# Patient Record
Sex: MALE
[De-identification: names, ages, dates, MRNs, and addresses within clinical notes are randomized per-mention and may not be internally consistent; named-entity substitution may affect disease eponyms.]

---

## 2018-05-09 ENCOUNTER — HOSPITAL ENCOUNTER (EMERGENCY)
Dept: HOSPITAL 25 - UCEAST | Age: 33
Discharge: HOME | End: 2018-05-09
Payer: COMMERCIAL

## 2018-05-09 VITALS — DIASTOLIC BLOOD PRESSURE: 72 MMHG | SYSTOLIC BLOOD PRESSURE: 130 MMHG
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
## 2018-05-09 DIAGNOSIS — T15.02XA: Primary | ICD-10-CM
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
## 2018-05-09 PROCEDURE — G0463 HOSPITAL OUTPT CLINIC VISIT: HCPCS

## 2018-05-09 PROCEDURE — 99203 OFFICE O/P NEW LOW 30 MIN: CPT

## 2018-05-09 NOTE — UC
Eye Complaint HPI





- HPI Summary


HPI Summary: 


PATIENT PRESENTS WITH 2 DAYS OF LEFT EYE REDNESS, IRRITATION AND FOREIGN BODY 

SENSATION.  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 

IN HIS EYE.  [place] AND WILL BE HEADING 

THROUGH [place] AND TOWARD THE [facility] TOMORROW.  HE HAS DECREASED 

VISION IN HIS LEFT EYE AND SOME PHOTOPHOBIA.  LEFT EYE IS TEARING.





- History of Current Complaint


Chief Complaint: UCEye


Stated Complaint: LEFT EYE REDNESS


Time Seen by Provider: 05/09/18 20:14


Hx Obtained From: Patient


Onset/Duration: Gradual Onset, Lasting Days, Still Present


Timing: Constant


Severity Initially: Moderate


Severity Currently: Moderate


Pain Intensity: 3


Pain Scale Used: 0-10 Numeric


Aggravating Factor(s): Nothing


Alleviating Factor(s): Nothing


Associated Signs And Symptoms: Positive: Photophobia, Drainage (Clear), Vision 

Impairment Left





- Allergies/Home Medications


Allergies/Adverse Reactions: 


 Allergies











Allergy/AdvReac Type Severity Reaction Status Date / Time


 


No Known Allergies Allergy   Verified 05/09/18 20:12














PMH/Surg Hx/FS Hx/Imm Hx


Previously Healthy: Yes





- Surgical History


Surgical History: Yes


Surgery Procedure, Year, and Place: DILATED VEINS IN SCROTUM





- Family History


Known Family History: 


   Negative: Hypertension





- Social History


Alcohol Use: None


Substance Use Type: None


Smoking Status (MU): Never Smoked Tobacco





Review of Systems


Constitutional: Negative


Skin: Negative


Eyes: Blurred Vision, Drainage, Eye Redness, Photophobia


Respiratory: Negative


Cardiovascular: Negative


Gastrointestinal: Negative


All Other Systems Reviewed And Are Negative: Yes





Physical Exam


Triage Information Reviewed: Yes


Appearance: Well-Appearing, Well-Nourished, Pain Distress - MOD


Vital Signs: 


 Initial Vital Signs











Temp  98 F   05/09/18 20:03


 


Pulse  72   05/09/18 20:03


 


Resp  16   05/09/18 20:03


 


BP  130/72   05/09/18 20:03


 


Pulse Ox  97   05/09/18 20:03











Vital Signs Reviewed: Yes


Eyes: Positive: Conjunctiva Inflamed - LEFT, Discharge - Clear drainage from 

left eye, Other: - PERRLA, EOMI.  FOREIGN BODY LOCATED CENTRALLY LEFT CORNEA


ENT: Positive: Hearing grossly normal


Neck: Positive: Supple


Respiratory: Positive: No respiratory distress, No accessory muscle use


Cardiovascular: Positive: Pulses Normal


Abdomen Description: Positive: Soft


Musculoskeletal: Positive: No Edema


Neurological: Positive: Alert


Psychological: Positive: Age Appropriate Behavior


Skin: Negative: rashes





Eye Complaint Course/Dx





- Course


Course Of Treatment: ATTEMPTED TO REMOVE FB USING 18G NEEDLE AND COTTON SWAB. 

MAJORITY REMOVED BUT A SMALL PIECE IS RETAINED. RECOMMEND OPHTH F/U TOMORROW. 

POLYTRIM EYE DROPS TO PREVENT INFECTION AND KETOROLAC EYE DROPS AS NEEDED FOR 

DISCOMFORT.





- Differential Dx/Diagnosis


Provider Diagnoses: LEFT EYE FB - RETAINED





Discharge





- Sign-Out/Discharge


Documenting (check all that apply): Discharge/Admit/Transfer





- Discharge Plan


Condition: Stable


Disposition: HOME


Prescriptions: 


Ketorolac 0.5% OPHTH (NF) 1 drop LEFT EYE QID PRN #1 btl


 PRN Reason: Pain


Patient Education Materials:  Corneal Abrasion (ED), Eye Foreign Body (ED)


Referrals: 


No Primary Care Phys,NOPCP [Primary Care Provider] - 


Additional Instructions: 


MOST OF THE FOREIGN BODY WAS REMOVED FROM YOUR LEFT CORNEA HOWEVER THERE IS A 

SMALL PIECE THAT IS RETAINED.  YOU NEED TO SEE AN OPHTHALMOLOGIST IN ORDER TO 

HAVE THIS REMOVED.  USE THE POLYTRIM ANTIBIOTIC EYE DROPS AS PRESCRIBED EVERY 4 

HOURS WHILE AWAKE AND THE ANTI-INFLAMMATORY EYEDROPS FOR DISCOMFORT AS NEEDED.  

CALL AN OPHTHALMOLOGIST IN YOUR AREA TOMORROW FOR AN APPOINTMENT TO BE SEEN.  

GO TO THE CLOSEST ED WITHOUT FAIL IF YOU DEVELOP WORSENING VISUAL DISTURBANCES, 

INCREASED PAIN, PURULENT DRAINAGE FROM YOUR EYE OR ANY OTHER CONCERNING 

SYMPTOMS.





- Billing Disposition and Condition


Condition: STABLE


Disposition: HOME

## 2018-06-06 ENCOUNTER — HOSPITAL ENCOUNTER (EMERGENCY)
Dept: HOSPITAL 25 - ED | Age: 33
LOS: 1 days | Discharge: HOME | End: 2018-06-07
Payer: COMMERCIAL

## 2018-06-06 VITALS — SYSTOLIC BLOOD PRESSURE: 142 MMHG | DIASTOLIC BLOOD PRESSURE: 72 MMHG

## 2018-06-06 DIAGNOSIS — S30.860A: Primary | ICD-10-CM
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
## 2018-06-06 DIAGNOSIS — M54.5: ICD-10-CM
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
## 2018-06-06 PROCEDURE — 99282 EMERGENCY DEPT VISIT SF MDM: CPT

## 2018-06-06 NOTE — ED
Bite Injury/Animal





- HPI Summary


HPI Summary: 





Complains of tick bite to right lower back today.  Patient's friend removed tick

, and stated some part was left in patient's body.  Patient denies fever, rash, 

N/V, abdominal pain, joint pain, HA.  Medical history is none.





- History of Current Complaint


Chief Complaint: EDAnimalBite


Stated Complaint: TICK BITE


Time Seen by Provider: 06/06/18 23:29


Hx Obtained From: Patient


Type of Bite: Wild Animal


Severity Currently: None


Pain Intensity: 0


Pain Scale Used: 0-10 Numeric





- Allergies/Home Medications


Allergies/Adverse Reactions: 


 Allergies











Allergy/AdvReac Type Severity Reaction Status Date / Time


 


No Known Allergies Allergy   Verified 06/06/18 23:18














PMH/Surg Hx/FS Hx/Imm Hx


Previously Healthy: Yes


Endocrine/Hematology History: 


   Denies: Hx Anticoagulant Therapy, Hx Blood Disorders, Hx Blood Transfusions, 

Hx Bone Marrow Disease, Hx Diabetes, Hx Systemic Lupus Erythematosus, Hx Sickle 

Cell Disease, Hx Thyroid Disease, Hx Anemia, Hx Unexplained Bleeding, Hx 

Coagulopothy, Autoimmune Disease, Other Endocrine/Hematological Disorders





- Surgical History


Surgery Procedure, Year, and Place: DILATED VEINS IN SCROTUM


Infectious Disease History: No


Infectious Disease History: 


   Denies: Traveled Outside the [place] in Last 30 Days





- Family History


Known Family History: 


   Negative: Hypertension





- Social History


Alcohol Use: None


Substance Use Type: Reports: None


Smoking Status (MU): Never Smoked Tobacco





Review of Systems


Constitutional: Negative


Eyes: Negative


ENT: Negative


Cardiovascular: Negative


Respiratory: Negative


Gastrointestinal: Negative


Genitourinary: Negative


Musculoskeletal: Negative


Positive: Other


Neurological: Negative


Psychological: Normal


All Other Systems Reviewed And Are Negative: Yes





Physical Exam





- Summary


Physical Exam Summary: 





Small raised area of erythema around tick bite site.  Small portion of tick 

body remaining in bite site.  FB removed using a scalpel and tweezers.  Cleaned 

with Betadine.  No bull's-eye rash, purulent drainage.


Triage Information Reviewed: Yes


Vital Signs On Initial Exam: 


 Initial Vitals











Temp Pulse Resp BP Pulse Ox


 


 98.4 F   60   18   142/72   98 


 


 06/06/18 23:16  06/06/18 23:16  06/06/18 23:16  06/06/18 23:16  06/06/18 23:16











Vital Signs Reviewed: Yes


Appearance: Positive: Well-Appearing


Skin: Positive: Warm


Head/Face: Positive: Normal Head/Face Inspection


Eyes: Positive: Normal


Neck: Positive: Supple


Respiratory/Lung Sounds: Positive: Clear to Auscultation


Cardiovascular: Positive: Normal


Abdomen Description: Positive: Nontender


Musculoskeletal: Positive: Normal


Neurological: Positive: Normal


Psychiatric: Positive: Normal


AVPU Assessment: Alert





- [place] Coma Scale


Best Eye Response: 4 - Spontaneous


Best Motor Response: 6 - Obeys Commands


Best Verbal Response: 5 - Oriented


Coma Scale Total: 15





Diagnostics





- Vital Signs


 Vital Signs











  Temp Pulse Resp BP Pulse Ox


 


 06/06/18 23:16  98.4 F  60  18  142/72  98














- Laboratory


Lab Statement: Any lab studies that have been ordered have been reviewed, and 

results considered in the medical decision making process.





Bite Injury Course/Dx





- Course


Course Of Treatment: Complains of tick bite to right lower back today.  Patient'

s friend removed tick by squeezing body and pulling, and stated some part was 

left in patient's body.  Patient denies fever, rash, N/V, abdominal pain, joint 

pain, HA.  Medical history is none.Small raised area of erythema around tick 

bite site.  Small portion of tick body remaining in bite site.  FB removed 

using a scalpel and tweezers.  Cleaned with Betadine.  No bull's-eye rash, 

purulent drainage.  Prophylactic Doxy 200 mg by mouth here in the ED.  Follow 

up with primary care





- Diagnoses


Provider Diagnosis: 


 Tick bite








Discharge





- Sign-Out/Discharge


Documenting (check all that apply): Discharge/Admit/Transfer





- Discharge Plan


Condition: Stable


Disposition: HOME


Patient Education Materials:  Lyme Disease (ED), Tick Bite (ED)


Referrals: 


No Primary Care Phys,NOPCP [Primary Care Provider] - 


[facility] [Outside]


Additional Instructions: 


Follow-up with primary care.  Return to the ED for any new or worsening symptoms





- Billing Disposition and Condition


Condition: STABLE


Disposition: Home